# Patient Record
Sex: MALE | Race: WHITE | NOT HISPANIC OR LATINO
[De-identification: names, ages, dates, MRNs, and addresses within clinical notes are randomized per-mention and may not be internally consistent; named-entity substitution may affect disease eponyms.]

---

## 2022-05-23 ENCOUNTER — NON-APPOINTMENT (OUTPATIENT)
Age: 47
End: 2022-05-23

## 2022-05-23 PROBLEM — Z00.00 ENCOUNTER FOR PREVENTIVE HEALTH EXAMINATION: Status: ACTIVE | Noted: 2022-05-23

## 2022-05-24 ENCOUNTER — APPOINTMENT (OUTPATIENT)
Dept: OTOLARYNGOLOGY | Facility: CLINIC | Age: 47
End: 2022-05-24
Payer: COMMERCIAL

## 2022-05-24 VITALS — BODY MASS INDEX: 25.67 KG/M2 | HEIGHT: 74 IN | TEMPERATURE: 97.3 F | WEIGHT: 200 LBS

## 2022-05-24 PROCEDURE — 92567 TYMPANOMETRY: CPT

## 2022-05-24 PROCEDURE — 99203 OFFICE O/P NEW LOW 30 MIN: CPT | Mod: 25

## 2022-05-24 PROCEDURE — 69210 REMOVE IMPACTED EAR WAX UNI: CPT

## 2022-05-24 PROCEDURE — 92557 COMPREHENSIVE HEARING TEST: CPT

## 2022-05-24 NOTE — ASSESSMENT
[FreeTextEntry1] : -Findings were reviewed and discussed with the patient in detail\par -Good aural hygiene reviewed\par -Patient may use wax removal drops as needed\par the audiogram was ordered by me and interpreted by me today and the results are as follows-he has sloping to profound mixed hearing loss at 1000 Hz in his right ear.  In his left ear he has severe sensorineural hearing loss.\par Normal speech discrimination scores and tympanograms.\par He does not have a previous audiogram to compare this to.\par By history does not appear that this is a new problem.\par I did however ask him to discuss with his oncologist whether the medication he took was ototoxic.\par He will see me in follow-up in 3 months for repeat audiogram.\par -I will see the patient in follow up prn.

## 2022-05-24 NOTE — CONSULT LETTER
[Dear  ___] : Dear  [unfilled], [Courtesy Letter:] : I had the pleasure of seeing your patient, [unfilled], in my office today. [Please see my note below.] : Please see my note below. [Sincerely,] : Sincerely, [FreeTextEntry1] : \par \par Enclosed- audiogram. [FreeTextEntry3] : Erasmo Cheney MD\par [facility]- Co-Director\par [facility] Physician [facility]

## 2022-05-24 NOTE — HISTORY OF PRESENT ILLNESS
[de-identified] : Initial visit.\par His chief complaint is "heavy earwax distracting hearing".\par He reports he has a longstanding history of cerumen impaction.\par He does not report tinnitus.\par He does report that he just completed radiation therapy after receiving chemotherapy for rectal cancer.

## 2022-09-06 ENCOUNTER — APPOINTMENT (OUTPATIENT)
Dept: OTOLARYNGOLOGY | Facility: CLINIC | Age: 47
End: 2022-09-06

## 2022-09-06 DIAGNOSIS — H90.3 SENSORINEURAL HEARING LOSS, BILATERAL: ICD-10-CM

## 2022-09-06 DIAGNOSIS — H61.23 IMPACTED CERUMEN, BILATERAL: ICD-10-CM

## 2022-09-06 PROCEDURE — 92567 TYMPANOMETRY: CPT

## 2022-09-06 PROCEDURE — 92557 COMPREHENSIVE HEARING TEST: CPT

## 2022-09-06 PROCEDURE — G0268 REMOVAL OF IMPACTED WAX MD: CPT

## 2022-09-06 PROCEDURE — 99214 OFFICE O/P EST MOD 30 MIN: CPT | Mod: 25

## 2022-09-06 RX ORDER — FLUOCINOLONE ACETONIDE 0.11 MG/ML
0.01 OIL AURICULAR (OTIC)
Qty: 1 | Refills: 2 | Status: ACTIVE | COMMUNITY
Start: 2022-09-06 | End: 1900-01-01

## 2022-09-06 NOTE — HISTORY OF PRESENT ILLNESS
[de-identified] : Presents today for follow-up on significant bilateral sensorineural hearing loss.\par He is clear that he does not have a history of ear problems.\par He spoke with his oncologist who treated him with a combination of chemotherapy and radiation therapy for stage III rectal cancer did not feel that the medications are ototoxic.\par No he finishes treatment several months ago and at this time there is no evidence of disease.

## 2022-09-06 NOTE — CONSULT LETTER
[Dear  ___] : Dear  [unfilled], [Courtesy Letter:] : I had the pleasure of seeing your patient, [unfilled], in my office today. [Please see my note below.] : Please see my note below. [Sincerely,] : Sincerely, [FreeTextEntry1] : \par \par \par Enclosed- audiograms. [FreeTextEntry3] : Erasmo Cheney MD\par [facility]- Co-Director\par [facility] Physician [facility]

## 2022-09-06 NOTE — ASSESSMENT
[FreeTextEntry1] : -Findings were reviewed and discussed with the patient in detail\par -Good aural hygiene reviewed\par -Patient may use wax removal drops as needed\par the audiogram was ordered by me and interpreted by me today and the results are as follows-slight asymmetry in his right ear has returned to normal.  I suspect chemotherapy was a contributor.\par He is a candidate for hearing aid amplification which he is not motivated for at this time.\par I will also send [employer]  oil to help with cerumen impaction.\par -I will see the patient in follow up

## 2025-01-03 ENCOUNTER — NON-APPOINTMENT (OUTPATIENT)
Age: 50
End: 2025-01-03

## 2025-01-07 ENCOUNTER — OUTPATIENT (OUTPATIENT)
Dept: OUTPATIENT SERVICES | Facility: HOSPITAL | Age: 50
LOS: 1 days | Discharge: HOME | End: 2025-01-07
Payer: COMMERCIAL

## 2025-01-07 DIAGNOSIS — C20 MALIGNANT NEOPLASM OF RECTUM: ICD-10-CM

## 2025-01-08 ENCOUNTER — NON-APPOINTMENT (OUTPATIENT)
Age: 50
End: 2025-01-08

## 2025-01-08 ENCOUNTER — APPOINTMENT (OUTPATIENT)
Dept: HEMATOLOGY ONCOLOGY | Facility: CLINIC | Age: 50
End: 2025-01-08

## 2025-01-08 VITALS
SYSTOLIC BLOOD PRESSURE: 153 MMHG | HEIGHT: 72.24 IN | HEART RATE: 94 BPM | BODY MASS INDEX: 21.8 KG/M2 | DIASTOLIC BLOOD PRESSURE: 90 MMHG | WEIGHT: 160.93 LBS | OXYGEN SATURATION: 97 % | TEMPERATURE: 97.2 F | RESPIRATION RATE: 18 BRPM

## 2025-01-08 PROCEDURE — G2212 PROLONG OUTPT/OFFICE VIS: CPT

## 2025-01-08 PROCEDURE — 99205 OFFICE O/P NEW HI 60 MIN: CPT

## 2025-01-08 PROCEDURE — G2211 COMPLEX E/M VISIT ADD ON: CPT | Mod: NC

## 2025-01-10 PROCEDURE — 88321 CONSLTJ&REPRT SLD PREP ELSWR: CPT

## 2025-01-21 LAB — SURGICAL PATHOLOGY STUDY: SIGNIFICANT CHANGE UP

## 2025-01-22 ENCOUNTER — TRANSCRIPTION ENCOUNTER (OUTPATIENT)
Age: 50
End: 2025-01-22

## 2025-02-12 ENCOUNTER — APPOINTMENT (OUTPATIENT)
Dept: HEMATOLOGY ONCOLOGY | Facility: CLINIC | Age: 50
End: 2025-02-12